# Patient Record
Sex: MALE | Race: WHITE
[De-identification: names, ages, dates, MRNs, and addresses within clinical notes are randomized per-mention and may not be internally consistent; named-entity substitution may affect disease eponyms.]

---

## 2020-06-20 ENCOUNTER — HOSPITAL ENCOUNTER (EMERGENCY)
Dept: HOSPITAL 62 - ER | Age: 20
LOS: 2 days | Discharge: PSYCHIATRIC OR SUBSTANCE USE FACILITY | End: 2020-06-22
Payer: COMMERCIAL

## 2020-06-20 DIAGNOSIS — F17.200: ICD-10-CM

## 2020-06-20 DIAGNOSIS — F12.10: ICD-10-CM

## 2020-06-20 DIAGNOSIS — F19.10: ICD-10-CM

## 2020-06-20 DIAGNOSIS — R46.89: Primary | ICD-10-CM

## 2020-06-20 LAB
ADD MANUAL DIFF: NO
ALBUMIN SERPL-MCNC: 4.5 G/DL (ref 3.5–5)
ALP SERPL-CCNC: 80 U/L (ref 38–126)
ANION GAP SERPL CALC-SCNC: 12 MMOL/L (ref 5–19)
APAP SERPL-MCNC: < 10 UG/ML (ref 10–30)
APPEARANCE UR: CLEAR
APTT PPP: YELLOW S
AST SERPL-CCNC: 35 U/L (ref 17–59)
BARBITURATES UR QL SCN: NEGATIVE
BASOPHILS # BLD AUTO: 0 10^3/UL (ref 0–0.2)
BASOPHILS NFR BLD AUTO: 0.3 % (ref 0–2)
BILIRUB DIRECT SERPL-MCNC: 0 MG/DL (ref 0–0.4)
BILIRUB SERPL-MCNC: 0.7 MG/DL (ref 0.2–1.3)
BILIRUB UR QL STRIP: NEGATIVE
BUN SERPL-MCNC: 5 MG/DL (ref 7–20)
CALCIUM: 9.5 MG/DL (ref 8.4–10.2)
CHLORIDE SERPL-SCNC: 101 MMOL/L (ref 98–107)
CO2 SERPL-SCNC: 24 MMOL/L (ref 22–30)
EOSINOPHIL # BLD AUTO: 0.1 10^3/UL (ref 0–0.6)
EOSINOPHIL NFR BLD AUTO: 0.6 % (ref 0–6)
ERYTHROCYTE [DISTWIDTH] IN BLOOD BY AUTOMATED COUNT: 13.2 % (ref 11.5–14)
ETHANOL SERPL-MCNC: < 10 MG/DL
GLUCOSE SERPL-MCNC: 123 MG/DL (ref 75–110)
GLUCOSE UR STRIP-MCNC: NEGATIVE MG/DL
HCT VFR BLD CALC: 45 % (ref 37.9–51)
HGB BLD-MCNC: 15.7 G/DL (ref 13.5–17)
KETONES UR STRIP-MCNC: NEGATIVE MG/DL
LYMPHOCYTES # BLD AUTO: 2.3 10^3/UL (ref 0.5–4.7)
LYMPHOCYTES NFR BLD AUTO: 22.5 % (ref 13–45)
MCH RBC QN AUTO: 30.6 PG (ref 27–33.4)
MCHC RBC AUTO-ENTMCNC: 34.8 G/DL (ref 32–36)
MCV RBC AUTO: 88 FL (ref 80–97)
METHADONE UR QL SCN: NEGATIVE
MONOCYTES # BLD AUTO: 0.7 10^3/UL (ref 0.1–1.4)
MONOCYTES NFR BLD AUTO: 6.5 % (ref 3–13)
NEUTROPHILS # BLD AUTO: 7.1 10^3/UL (ref 1.7–8.2)
NEUTS SEG NFR BLD AUTO: 70.1 % (ref 42–78)
NITRITE UR QL STRIP: NEGATIVE
PCP UR QL SCN: NEGATIVE
PH UR STRIP: 7 [PH] (ref 5–9)
PLATELET # BLD: 211 10^3/UL (ref 150–450)
POTASSIUM SERPL-SCNC: 3 MMOL/L (ref 3.6–5)
PROT SERPL-MCNC: 7.2 G/DL (ref 6.3–8.2)
PROT UR STRIP-MCNC: NEGATIVE MG/DL
RBC # BLD AUTO: 5.12 10^6/UL (ref 4.35–5.55)
SALICYLATES SERPL-MCNC: < 1 MG/DL (ref 2–20)
SP GR UR STRIP: 1.01
TOTAL CELLS COUNTED % (AUTO): 100 %
URINE AMPHETAMINES SCREEN: NEGATIVE
URINE BENZODIAZEPINES SCREEN: NEGATIVE
URINE COCAINE SCREEN: NEGATIVE
URINE MARIJUANA (THC) SCREEN: (no result)
UROBILINOGEN UR-MCNC: NEGATIVE MG/DL (ref ?–2)
WBC # BLD AUTO: 10.1 10^3/UL (ref 4–10.5)

## 2020-06-20 PROCEDURE — 99285 EMERGENCY DEPT VISIT HI MDM: CPT

## 2020-06-20 PROCEDURE — 85025 COMPLETE CBC W/AUTO DIFF WBC: CPT

## 2020-06-20 PROCEDURE — 80307 DRUG TEST PRSMV CHEM ANLYZR: CPT

## 2020-06-20 PROCEDURE — 80048 BASIC METABOLIC PNL TOTAL CA: CPT

## 2020-06-20 PROCEDURE — 36415 COLL VENOUS BLD VENIPUNCTURE: CPT

## 2020-06-20 PROCEDURE — 93005 ELECTROCARDIOGRAM TRACING: CPT

## 2020-06-20 PROCEDURE — 80053 COMPREHEN METABOLIC PANEL: CPT

## 2020-06-20 PROCEDURE — 96372 THER/PROPH/DIAG INJ SC/IM: CPT

## 2020-06-20 PROCEDURE — 93010 ELECTROCARDIOGRAM REPORT: CPT

## 2020-06-20 PROCEDURE — 81001 URINALYSIS AUTO W/SCOPE: CPT

## 2020-06-20 NOTE — PSYCHOLOGICAL NOTE
Psych Note





- Psych Note


Date seen by psych provider: 06/20/20


Time seen by psych provider: 17:45 - [facility] collateral at 1745. ED Nurse Collateral

at 130. Observation at 1946.


Psych Note: 


Patient presented to the Emergency Department this evening via POV/father for 

altered mental status after going to [facility] for inpatient hospitalization. 

[facility] admission staff (Vladimir) reported patient had dilated pupils and was 

unable to answer questions appropriately so was unable to complete their 

assessment. Father told [facility] staff patient "acted this way when he was 

abusing Dimethyltryptamine in the past." Patient shut himself in an Emergency 

Department room with cleaning staff, medical and security were immediately 

involved, got patient seated in chair in hallway and he started carrying on 

conversation with himself as if someone else were there. Patient appears to be 

under the influence of some substance which impairs insight, judgment and 

impulse control. Patient is a danger to self and others at this time.





Observed patient in his ED room, sitting on side edge of bed, carrying on 

dialogue conversation as if talking with someone when he was the only person in 

the room. 





Clinical Presentation:


Psychosis





Medication recommendations made by the psychiatric medication provider, Dr. Shelby DRAKE., includes:


Add Haldol 5MG PO/IM every 6 hours as needed for psychosis/agitation


Add Cogentin 1MG PO/IM once daily to curb tremor side effects often associated 

with antipsychotic medications





Impression/Plan: Recommendation for 24 Hour Petition for Evaluation given 

psychotic presentation. Consulted with Dr. Wheat regarding the management and 

care of patient. ED Physician in agreement with recommendations.

## 2020-06-20 NOTE — ER DOCUMENT REPORT
ED Psych Disorder / Suicide





- General


Chief Complaint: Psych Problem


Stated Complaint: PSYCH ISSUE


Time Seen by Provider: 06/20/20 18:58


Mode of Arrival: Ambulatory


Information source: Patient


Notes: 





20-year-old male patient presents the emergency department with concerns for 

possible drug abuse.  Patient's father brought patient here, patient's father 

reported to staff members that patient used to  get high on cough and cold 

medication.  His father states that he is acting similar to then.  It is unknown

if patient has any suicidal homicidal ideations as he is unable to be fully 

examined at this time.  He is a flight of ideas, he keeps coming in and out of 

his room walking around yelling nonsensical things.  Nursing staff tells me that

patient has had previous hospitalization at [facility].











- Related Data


Allergies/Adverse Reactions: 


                                        





No Known Allergies Allergy (Verified 06/20/20 19:01)


   











Past Medical History





- General


Information source: Patient, Parent - Father





- Social History


Smoking Status: Current Every Day Smoker


Frequency of alcohol use: None


Drug Abuse: Marijuana, Other - synthetic drugs/cough medicine


Family History: Reviewed & Not Pertinent


Patient has homicidal ideation: No





- Medical History


Medical History: Negative


Surgical Hx: Negative





- Immunizations


Immunizations up to date: Yes





Review of Systems





- Review of Systems


-: Yes ROS unobtainable due to patient's medical condition





Physical Exam





- Vital signs


Vitals: 


                                        











Temp Pulse Resp BP Pulse Ox


 


 98.5 F   105 H  18   117/61   96 


 


 06/20/20 20:39  06/20/20 20:39  06/20/20 20:39  06/20/20 20:39  06/20/20 20:39














- Notes


Notes: 





PHYSICAL EXAMINATION:





GENERAL: Well-nourished and in no acute distress.





HEAD: Atraumatic, normocephalic.





EYES: Pupils equal round extraocular movements intact,  conjunctiva are normal.





ENT: Nares patent





NECK: Normal range of motion





LUNGS: No respiratory distress, lung sounds clear and equal bilaterally.





Musculoskeletal: Normal range of motion





NEUROLOGICAL:  Normal speech, normal gait. 





PSYCH: Hyper, flight of ideas, pacing around the room in the hallway.





SKIN: Warm, Dry, normal turgor, no rashes or lesions noted.





Course





- Re-evaluation


Re-evalutation: 





06/20/20 19:42


Unable to do a thorough physical examination on the patient at this time as 

patient is a flight of ideas and is very hyper and animated walking around his 

room and also out into the hallway.  Medication orders placed.  Patient will be 

given a physical examination as soon as safely able to.  Orders placed for IVC. 

Psych team is initiating an IVC petition and will evaluate patient fully tomorr

ow.





06/20/20 23:19


Patient is more calm now he has been resting comfortably with no acute distress.

 His potassium was 3.  He did take the oral potassium as ordered.  He is pending

full psych evaluation in the morning.  He is medically cleared at this time.








- Vital Signs


Vital signs: 


                                        











Temp Pulse Resp BP Pulse Ox


 


 98.5 F   105 H  18   117/61   96 


 


 06/20/20 20:39  06/20/20 20:39  06/20/20 20:39  06/20/20 20:39  06/20/20 20:39














- Laboratory


Result Diagrams: 


                                 06/20/20 20:20





                                 06/20/20 20:20


Laboratory results interpreted by me: 


                                        











  06/20/20





  20:20


 


Potassium  3.0 L*


 


BUN  5 L


 


Glucose  123 H


 


Salicylates  < 1.0 L


 


Acetaminophen  < 10 L














Discharge





- Discharge


Clinical Impression: 


 Bizarre behavior





Condition: Stable


Disposition: PSYCH HOSP/UNIT

## 2020-06-21 LAB
ANION GAP SERPL CALC-SCNC: 9 MMOL/L (ref 5–19)
BUN SERPL-MCNC: 8 MG/DL (ref 7–20)
CALCIUM: 9.5 MG/DL (ref 8.4–10.2)
CHLORIDE SERPL-SCNC: 101 MMOL/L (ref 98–107)
CO2 SERPL-SCNC: 29 MMOL/L (ref 22–30)
GLUCOSE SERPL-MCNC: 96 MG/DL (ref 75–110)
POTASSIUM SERPL-SCNC: 3.3 MMOL/L (ref 3.6–5)

## 2020-06-21 RX ADMIN — HALOPERIDOL SCH MG: 5 TABLET ORAL at 22:02

## 2020-06-21 NOTE — ER DOCUMENT REPORT
Doctor's Note


Notes: 





06/21/20 21:11


PHYSICAL EXAMINATION:





GENERAL: Appears well, healthy, well-nourished, no acute distress. 





LUNGS: Equal breath sounds bilaterally and clear to auscultation.  No wheezes 

rales or rhonchi.





CARDIOVASCULAR: S1-S2, regular rate, regular rhythm.  Radial pulses 2+, normal.





ABDOMEN: Normoactive bowel sounds.  Soft, nontender,  no guarding, no rebound 

tenderness, and no masses palpated.





PSYCH: Animated affect.





Patient is asking for a cigarette.  I educated the patient that smoking is not 

allowed here in the emergency department.  I offered him a nicotine patch and he

refused.  Patient states that he wants to go home.  Augmentations are for Haldol

5 mg IM or p.o. every 8 hours.  They also would like him to be on Cogentin 1 mg 

IM or p.o. daily.

## 2020-06-22 VITALS — DIASTOLIC BLOOD PRESSURE: 77 MMHG | SYSTOLIC BLOOD PRESSURE: 137 MMHG

## 2020-06-22 RX ADMIN — HALOPERIDOL SCH MG: 5 TABLET ORAL at 06:55

## 2020-06-22 NOTE — PSYCHOLOGICAL NOTE
Psych Note





- Psych Note


Date seen by psych provider: 06/22/20


Time seen by psych provider: 10:50


Psych Note: 


Reason for Consult: AMS





Patient presented to the [facility] ED  via POV (his father ) for altered mental status

after going to [facility] for inpatient hospitalization. [facility] admission 

staff (Vladimir) reported patient had dilated pupils and was unable to answer 

questions appropriately so was unable to complete their assessment. 





Patient reports He has only been using THC and tobacco.  He confirms he use to 

use other drugs but reports he has abstained for the last 2 1/2 years.  He 

reports he has been having difficulty sleeping and eating; he states he needs 

help "relaxing." Patient quickly derails in his conversation with clinician and 

becomes confused.  He attempts to answer questions but provided conflicting 

information ie "yesterday I saw something that scared me....I have no idea how 

to answer your question....I don't know I have seen anything that has scared me 

recently....I trust the doctors and what they think I need to do." 





Patient demonstrated increased agitation when he is unable to answer questions. 

He will not make eye contact.  Thought processes are disorganized.  attention 

and concentration is poor.  Insight and judgment is currently impair (unclear 

cause ie mental health or unknown substance use).  Patient is calm and attempts 

to engage appropriately.  He denies suicidal and homicidal ideation. While 

patient was laying in stretcher with lights off, it does not seem as if the 

patient was asleep (he quickly acknowledged clinician and sat up). 





Chart review conducted:


Toxicology reports findings; THC





Clinical Presentation:


Disorganized thought processes


Psychosis; Probable hallucinations





Medication recommendations made by the psychiatric medication provider, Dr. Shelby KATZ, includes:


Add Haldol 5MG PO/IM every 6 hours as needed for psychosis/agitation


Add Cogentin 1MG PO/IM once daily to curb tremor side effects often associated 

with antipsychotic medications





Impression/Plan: Patient is recommended to continue under IVC.  Patient 

continues to present confused with disorganized thought processes.  He attempted

to engage but quickly becomes confused and unable to answer appropriately.  

While patient does not make eye contact; it currently does not appear he is 

experiencing hallucinations while clinician is in the room; however, there is 

concern the patient has been experiencing them on and off.  Patient has been 

accepted to [facility]; transportation has been requested.  Dr. Wheat 

was consulted on the care and management of this patient; attending physician is

in agreement with the recommendations and disposition.
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
## 2020-07-24 NOTE — EKG REPORT
Group Therapy Note    Date: 7/24/2020    Group Start Time: 1030  Group End Time: 1130  Group Topic: Relaxation    [facility]        Group Therapy Note    Attendees: 5    Patient's Goal: to engage in coloring and listening to music to achieve a relaxed state, practice utilizing coping skills, and improve overall mood. Notes:  Sathish Portillo was late to group. Pt intermittently engaged in activity. Pt presented as paranoid, asking MT-BC \"Are you all forcing us to get surgery now? \" MT-BC provided pt with reorientation and support. Pt reported understanding. Sathish Portillo was observed sitting at the table and looking around the room when not engaging in activity.      Status After Intervention:  Unchanged    Participation Level: Minimal    Participation Quality: Attentive      Speech:  normal      Thought Process/Content: Delusional      Affective Functioning: Constricted/Restricted      Mood: anxious      Level of consciousness:  Alert      Response to Learning: Progressing to goal      Endings: None Reported    Modes of Intervention: Education, Support, Clarifying, Problem-solving, Activity and Media      Discipline Responsible: Psychoeducational Specialist      Signature:  SANTOS Gonzalez SEVERITY:- OTHERWISE NORMAL ECG -

SINUS ARRHYTHMIA, RATE 

:

Confirmed by: Nicholas Henry MD 21-Jun-2020 09:56:08